# Patient Record
Sex: FEMALE | Race: WHITE | Employment: OTHER | URBAN - METROPOLITAN AREA
[De-identification: names, ages, dates, MRNs, and addresses within clinical notes are randomized per-mention and may not be internally consistent; named-entity substitution may affect disease eponyms.]

---

## 2017-01-06 DIAGNOSIS — N76.0 BV (BACTERIAL VAGINOSIS): Primary | ICD-10-CM

## 2017-01-06 DIAGNOSIS — B96.89 BV (BACTERIAL VAGINOSIS): Primary | ICD-10-CM

## 2017-01-06 RX ORDER — METRONIDAZOLE 7.5 MG/G
1 GEL VAGINAL AT BEDTIME
Qty: 70 G | Refills: 0 | Status: SHIPPED | OUTPATIENT
Start: 2017-01-06 | End: 2017-01-11

## 2017-02-16 DIAGNOSIS — Z30.41 USES ORAL CONTRACEPTION: Primary | ICD-10-CM

## 2017-02-16 DIAGNOSIS — Z30.41 ENCOUNTER FOR SURVEILLANCE OF CONTRACEPTIVE PILLS: ICD-10-CM

## 2017-02-17 RX ORDER — LEVONORGESTREL/ETHIN.ESTRADIOL 0.1-0.02MG
1 TABLET ORAL DAILY
Qty: 84 TABLET | Refills: 0 | Status: SHIPPED | OUTPATIENT
Start: 2017-02-17 | End: 2017-02-21

## 2017-02-21 RX ORDER — LEVONORGESTREL/ETHIN.ESTRADIOL 0.1-0.02MG
1 TABLET ORAL DAILY
Qty: 84 TABLET | Refills: 0 | Status: SHIPPED | OUTPATIENT
Start: 2017-02-21 | End: 2018-07-17

## 2018-07-08 ENCOUNTER — HOSPITAL ENCOUNTER (EMERGENCY)
Facility: CLINIC | Age: 33
Discharge: HOME | End: 2018-07-08
Attending: EMERGENCY MEDICINE | Admitting: EMERGENCY MEDICINE

## 2018-07-08 VITALS
SYSTOLIC BLOOD PRESSURE: 100 MMHG | RESPIRATION RATE: 18 BRPM | OXYGEN SATURATION: 98 % | WEIGHT: 99.2 LBS | TEMPERATURE: 99.1 F | DIASTOLIC BLOOD PRESSURE: 67 MMHG | BODY MASS INDEX: 16.94 KG/M2 | HEIGHT: 64 IN

## 2018-07-08 DIAGNOSIS — N10 ACUTE PYELONEPHRITIS: ICD-10-CM

## 2018-07-08 PROCEDURE — 96375 TX/PRO/DX INJ NEW DRUG ADDON: CPT

## 2018-07-08 PROCEDURE — 99285 EMERGENCY DEPT VISIT HI MDM: CPT | Mod: 25

## 2018-07-08 PROCEDURE — 96361 HYDRATE IV INFUSION ADD-ON: CPT

## 2018-07-08 PROCEDURE — 25000128 H RX IP 250 OP 636: Performed by: EMERGENCY MEDICINE

## 2018-07-08 PROCEDURE — 96374 THER/PROPH/DIAG INJ IV PUSH: CPT

## 2018-07-08 PROCEDURE — 96376 TX/PRO/DX INJ SAME DRUG ADON: CPT

## 2018-07-08 RX ORDER — MORPHINE SULFATE 2 MG/ML
2 INJECTION, SOLUTION INTRAMUSCULAR; INTRAVENOUS ONCE
Status: COMPLETED | OUTPATIENT
Start: 2018-07-08 | End: 2018-07-08

## 2018-07-08 RX ORDER — HYDROCODONE BITARTRATE AND ACETAMINOPHEN 5; 325 MG/1; MG/1
1 TABLET ORAL EVERY 6 HOURS PRN
Qty: 10 TABLET | Refills: 0 | Status: SHIPPED | OUTPATIENT
Start: 2018-07-08 | End: 2018-07-17

## 2018-07-08 RX ORDER — ONDANSETRON 4 MG/1
4 TABLET, ORALLY DISINTEGRATING ORAL EVERY 6 HOURS PRN
Qty: 10 TABLET | Refills: 0 | Status: SHIPPED | OUTPATIENT
Start: 2018-07-08 | End: 2018-07-11

## 2018-07-08 RX ORDER — ONDANSETRON 2 MG/ML
4 INJECTION INTRAMUSCULAR; INTRAVENOUS ONCE
Status: COMPLETED | OUTPATIENT
Start: 2018-07-08 | End: 2018-07-08

## 2018-07-08 RX ORDER — MORPHINE SULFATE 4 MG/ML
4 INJECTION, SOLUTION INTRAMUSCULAR; INTRAVENOUS ONCE
Status: COMPLETED | OUTPATIENT
Start: 2018-07-08 | End: 2018-07-08

## 2018-07-08 RX ADMIN — MORPHINE SULFATE 2 MG: 2 INJECTION, SOLUTION INTRAMUSCULAR; INTRAVENOUS at 08:42

## 2018-07-08 RX ADMIN — ONDANSETRON 4 MG: 2 INJECTION INTRAMUSCULAR; INTRAVENOUS at 07:19

## 2018-07-08 RX ADMIN — SODIUM CHLORIDE 1000 ML: 9 INJECTION, SOLUTION INTRAVENOUS at 07:14

## 2018-07-08 RX ADMIN — MORPHINE SULFATE 4 MG: 4 INJECTION INTRAVENOUS at 07:19

## 2018-07-08 ASSESSMENT — ENCOUNTER SYMPTOMS
FEVER: 1
VOMITING: 1
DYSURIA: 0
BACK PAIN: 1

## 2018-07-08 NOTE — ED AVS SNAPSHOT
Emergency Department    [address] [facility] [address]    Phone:  [phone]    Fax:  [phone]                                       Kassandra Aguilar   MRN: [id number]    Department:   Emergency Department   Date of Visit:  7/8/2018           Patient Information     Date Of Birth          1985        Your diagnoses for this visit were:     Acute pyelonephritis        You were seen by Louise Price MD.      Follow-up Information     Follow up with Robin Arreguin MD In 2 days.    Specialty:  Internal Medicine    Why:  to ensure you are improving    Contact information:    [address]  [facility] [address] [phone]          Follow up with  Emergency Department.    Specialty:  EMERGENCY MEDICINE    Why:  As needed, If symptoms worsen    Contact information:    [address] [facility] [address] [phone]        Discharge Instructions         Discharge Instructions for Pyelonephritis  You have been told you have a kidney infection. This is called pyelonephritis. The infection can be serious. It can damage your kidneys and cause bacteria to enter your bloodstream. You were treated in the hospital. Once you return home, here s what you can do at home to aid in your recovery and prevent future infections.  Home care    Take all the medicine you were prescribed, even if you feel better. Not finishing the medicine can make the infection come back. It may also make a future infection harder to treat.    Unless told not to by your healthcare provider, drink 8 to 12 glasses of fluid every day. Clear fluids, such as water, are best. This may help flush the infection from your system.  Preventing future infection    Keep your genital area clean. Use mild soap. Rinse with water.    If you are a woman, always wipe the genital area from front to back.    Urinate frequently. Avoid holding urine in the bladder for a long  time.    Always urinate after sexual intercourse.  Follow-up care  Follow up with your healthcare provider, or as advised. And see your healthcare provider for regular lab tests as directed.     When to call your healthcare provider  Call your healthcare provider right away if you have any of the following:    Decreased urine output or trouble urinating    Severe pain in the lower back or flank    Fever of 100.4 F (38 C) or higher, or as directed by your healthcare provider    Shaking chills    Vomiting    Blood in your urine    Dark-colored or foul-smelling urine    Nausea or other problems that prevent you from taking your prescribed medicine   Date Last Reviewed: 2/1/2017 2000-2017 [employer]. [address] [place] [address], [place] [address]. All rights reserved. This information is not intended as a substitute for professional medical care. Always follow your healthcare professional's instructions.          24 Hour Appointment Hotline       To make an appointment at any [facility], call [phone] ([phone]). If you don't have a family doctor or clinic, we will help you find one. [facility] clinics are conveniently located to serve the needs of you and your family.             Review of your medicines      START taking        Dose / Directions Last dose taken    HYDROcodone-acetaminophen 5-325 MG per tablet   Commonly known as:  NORCO   Dose:  1 tablet   Quantity:  10 tablet        Take 1 tablet by mouth every 6 hours as needed for severe pain   Refills:  0        ondansetron 4 MG ODT tab   Commonly known as:  ZOFRAN ODT   Dose:  4 mg   Quantity:  10 tablet        Take 1 tablet (4 mg) by mouth every 6 hours as needed for nausea   Refills:  0          Our records show that you are taking the medicines listed below. If these are incorrect, please call your family doctor or clinic.        Dose / Directions Last dose taken    LEVAQUIN PO        Refills:  0        levonorgestrel-ethinyl  estradiol 0.1-20 MG-MCG per tablet   Commonly known as:  AUBRA   Dose:  1 tablet   Quantity:  84 tablet        Take 1 tablet by mouth daily   Refills:  0                Information about OPIOIDS     PRESCRIPTION OPIOIDS: WHAT YOU NEED TO KNOW   We gave you an opioid (narcotic) pain medicine. It is important to manage your pain, but opioids are not always the best choice. You should first try all the other options your care team gave you. Take this medicine for as short a time (and as few doses) as possible.     These medicines have risks:    DO NOT drive when on new or higher doses of pain medicine. These medicines can affect your alertness and reaction times, and you could be arrested for driving under the influence (DUI). If you need to use opioids long-term, talk to your care team about driving.    DO NOT operate heave machinery    DO NOT do any other dangerous activities while taking these medicines.     DO NOT drink any alcohol while taking these medicines.      If the opioid prescribed includes acetaminophen, DO NOT take with any other medicines that contain acetaminophen. Read all labels carefully. Look for the word  acetaminophen  or  Tylenol.  Ask your pharmacist if you have questions or are unsure.    You can get addicted to pain medicines, especially if you have a history of addiction (chemical, alcohol or substance dependence). Talk to your care team about ways to reduce this risk.    Store your pills in a secure place, locked if possible. We will not replace any lost or stolen medicine. If you don t finish your medicine, please throw away (dispose) as directed by your pharmacist. The [place] [employer]ution Control Agency has more information about safe disposal: https://www.pca.[facility].[place].us/living-green/managing-unwanted-medications.     All opioids tend to cause constipation. Drink plenty of water and eat foods that have a lot of fiber, such as fruits, vegetables, prune juice, apple juice and high-fiber  cereal. Take a laxative (Miralax, milk of magnesia, Colace, Senna) if you don t move your bowels at least every other day.         Prescriptions were sent or printed at these locations (2 Prescriptions)                   Other Prescriptions                Printed at Department/Unit printer (2 of 2)         HYDROcodone-acetaminophen (NORCO) 5-325 MG per tablet               ondansetron (ZOFRAN ODT) 4 MG ODT tab                Orders Needing Specimen Collection     None      Pending Results     No orders found from 7/6/2018 to 7/9/2018.            Pending Culture Results     No orders found from 7/6/2018 to 7/9/2018.            Pending Results Instructions     If you had any lab results that were not finalized at the time of your Discharge, you can call the ED Lab Result RN at [phone]. You will be contacted by this team for any positive Lab results or changes in treatment. The nurses are available 7 days a week from 10A to 6:30P.  You can leave a message 24 hours per day and they will return your call.        Test Results From Your Hospital Stay               Clinical Quality Measure: Blood Pressure Screening     Your blood pressure was checked while you were in the emergency department today. The last reading we obtained was  BP: 100/70 . Please read the guidelines below about what these numbers mean and what you should do about them.  If your systolic blood pressure (the top number) is less than 120 and your diastolic blood pressure (the bottom number) is less than 80, then your blood pressure is normal. There is nothing more that you need to do about it.  If your systolic blood pressure (the top number) is 120-139 or your diastolic blood pressure (the bottom number) is 80-89, your blood pressure may be higher than it should be. You should have your blood pressure rechecked within a year by a primary care provider.  If your systolic blood pressure (the top number) is 140 or greater or your diastolic blood  pressure (the bottom number) is 90 or greater, you may have high blood pressure. High blood pressure is treatable, but if left untreated over time it can put you at risk for heart attack, stroke, or kidney failure. You should have your blood pressure rechecked by a primary care provider within the next 4 weeks.  If your provider in the emergency department today gave you specific instructions to follow-up with your doctor or provider even sooner than that, you should follow that instruction and not wait for up to 4 weeks for your follow-up visit.        Thank you for choosing [facility]       Thank you for choosing [facility] for your care. Our goal is always to provide you with excellent care. Hearing back from our patients is one way we can continue to improve our services. Please take a few minutes to complete the written survey that you may receive in the mail after you visit with us. Thank you!        [employer] Information     [employer] gives you secure access to your electronic health record. If you see a primary care provider, you can also send messages to your care team and make appointments. If you have questions, please call your primary care clinic.  If you do not have a primary care provider, please call [phone] and they will assist you.        Care EveryWhere ID     This is your Care EveryWhere ID. This could be used by other organizations to access your [facility] medical records  [id number]        Equal Access to Services     JIMMY GALARZA : Christine Dent, waaxda luqadaha, qaybta kaalmada adedebbie, bart funes. So [facility] [phone].    ATENCIÓN: Si habla español, tiene a sharp disposición servicios gratuitos de asistencia lingüística. Llame al [phone].    We comply with applicable federal civil rights laws and [place] laws. We do not discriminate on the basis of race, color, national origin, age, disability, sex, sexual orientation, or gender  identity.            After Visit Summary       This is your record. Keep this with you and show to your community pharmacist(s) and doctor(s) at your next visit.

## 2018-07-08 NOTE — DISCHARGE INSTRUCTIONS
Discharge Instructions for Pyelonephritis  You have been told you have a kidney infection. This is called pyelonephritis. The infection can be serious. It can damage your kidneys and cause bacteria to enter your bloodstream. You were treated in the hospital. Once you return home, here s what you can do at home to aid in your recovery and prevent future infections.  Home care    Take all the medicine you were prescribed, even if you feel better. Not finishing the medicine can make the infection come back. It may also make a future infection harder to treat.    Unless told not to by your healthcare provider, drink 8 to 12 glasses of fluid every day. Clear fluids, such as water, are best. This may help flush the infection from your system.  Preventing future infection    Keep your genital area clean. Use mild soap. Rinse with water.    If you are a woman, always wipe the genital area from front to back.    Urinate frequently. Avoid holding urine in the bladder for a long time.    Always urinate after sexual intercourse.  Follow-up care  Follow up with your healthcare provider, or as advised. And see your healthcare provider for regular lab tests as directed.     When to call your healthcare provider  Call your healthcare provider right away if you have any of the following:    Decreased urine output or trouble urinating    Severe pain in the lower back or flank    Fever of 100.4 F (38 C) or higher, or as directed by your healthcare provider    Shaking chills    Vomiting    Blood in your urine    Dark-colored or foul-smelling urine    Nausea or other problems that prevent you from taking your prescribed medicine   Date Last Reviewed: 2/1/2017 2000-2017 [employer]. [address] [place] [address], [place] [address]. All rights reserved. This information is not intended as a substitute for professional medical care. Always follow your healthcare professional's instructions.

## 2018-07-08 NOTE — ED PROVIDER NOTES
History     Chief Complaint:  Back Pain    HPI   Kassandra Aguilar is a 32 year old female with current pyelonephritis who presents with back pain. The patient reports that she was diagnosed with a [place] yesterday [place] [facility] [place] Rocephin there and discharged with 7 days of Levaquin, which she took last night and then vomited at 0300 hours today. She reports that she originally presented for symptoms of left sided back pain in the evenings with resolution during the day, but since discharge her back pain has been constant, prompting her presentation by significant other. She describes her pain as located to the lower back with upwards radiation. She denies the symptoms of burning with urination but endorses subjective fever. Furthermore, she states that she was not written discharge antiemetics and pain meds.     [facility] Labs:    ED CHEMISTRY LABS(NA,K,CL,CO2,GLU,CREAT,CA-IONIZED,ANION GAP) (07/07/2018 3:28 PM)   Component Value Ref Range   Chloride 107 92 - 108 mEq/L   ICA, Actual 4.43 4.40 - 5.20 mg/dL   Sodium 136 135 - 148 mEq/L   Glucose 110 (H) 70 - 100 mg/dL   Creatinine 0.70 0.50 - 1.00 mg/dL   GFR, African American 118 >=60 ml/min/1.73m2   GFR, Non- 98 >=60 ml/min/1.73m2   BICARB 23 22 - 26 mEq/L   ICA, pH Corrected 4.37 (L) 4.40 - 5.20 mg/dL   AnGap 6 (L) 8 - 16 mEq/L   Potassium 3.0 (L) 3.5 - 5.3 mEq/L     POC PREGNANCY TEST URINE (07/07/2018 2:47 PM)   Component Value Ref Range   Pregnancy Ur Negative Negative     POC URINE MULTISTIX 10 (07/07/2018 2:46 PM)   Component Value Ref Range   Urine Glucose Negative Negative   Bili UA Negative Negative   Ketones Trace Negative   Specific Gravity 1.020 1.003 - 1.030   Blood Ur Moderate (A) Neg - Trace   PH Urine 6.0 5.0 - 7.0   Protein Ur 100 (A) Neg - Tr mg/dL   Urobilinogen 1.0 0.2 - 1.0 E.U./dL   Nitrite Ur Positive (A) Negative   Leuk Est Small (A) Negative       Allergies:  No known drug  "allergies    Medications:    Levaquin    Past Medical History:    Need for prophylactic immunotherapy  Pyelonephritis    Past Surgical History:    History reviewed. No pertinent surgical history.    Family History:    Mother: Thyroid Disease; Migraines    Social History:  Marital Status:  Single   Tobacco Status: Never Used  Alcohol Use: 2 Standard drinks or equivalent per week  Presents With: Significant Other         Review of Systems   Constitutional: Positive for fever.   Gastrointestinal: Positive for vomiting.   Genitourinary: Negative for dysuria.   Musculoskeletal: Positive for back pain.   All other systems reviewed and are negative.    Physical Exam   First Vitals:  BP: 115/60  Heart Rate: 101  Temp: 99.1  F (37.3  C)  Resp: 18  Height: 162.6 cm (5' 4\")  Weight: 45 kg (99 lb 3.2 oz)  SpO2: 98 %    Patient Vitals for the past 24 hrs:   BP Temp Temp src Heart Rate Resp SpO2 Height Weight   07/08/18 0745 99/65 - - - - 98 % - -   07/08/18 0730 94/69 - - - - 97 % - -   07/08/18 0652 115/60 99.1  F (37.3  C) Oral 101 18 98 % 1.626 m (5' 4\") 45 kg (99 lb 3.2 oz)       Physical Exam  General/Appearance: appears stated age, well-groomed, appears comfortable  Eyes: EOMI, no scleral injection, no icterus  ENT: MMM  Neck: supple, nl ROM, no stiffness  Cardiovascular: RRR, nl S1S2, no m/r/g, 2+ pulses in all 4 extremities, cap refill <2sec  Respiratory: CTAB, good air movement throughout, no wheezes/rhonchi/rales, no increased WOB, no retractions  Back: no lesions, + L CVA ttp  GI: abd soft, non-distended, nttp,  no HSM, no rebound, no guarding, nl BS  MSK: VIGIL, good tone, no bony abnormality  Skin: warm and well-perfused, no rash, no edema, no ecchymosis, nl turgor  Neuro: GCS 15, alert and oriented, no gross focal neuro deficits  Psych: interacts appropriately  Heme: no petechia, no purpura, no active bleeding    Emergency Department Course     Interventions:  0714: 0.9% Sodium Chloride Bolus 1000 ml IV   0719: " Zofran 4mg IV injection   0719: Morphine Injection, 4 mg, IV  The patient's symptoms were improved with parenteral narcotics.    Emergency Department Course:  Past medical records, nursing notes, and vitals reviewed.  0706: I performed an exam of the patient and obtained history, as documented above.   IV inserted. The patient was placed on continuous cardiac monitoring and pulse oximetry.  0838: I rechecked the patient. The patient has tolerated PO challenge. Findings and plan explained to the Patient. Patient discharged home with instructions regarding supportive care, medications, and reasons to return. The importance of close follow-up was reviewed.     Impression & Plan      Medical Decision Making:  Kassandra Aguilar is a 32 year old female who was diagnosed with acute pyelonephritis yesterday at [facility] after she had a fever with left flank pain.  She was given ceftriaxone and started on Levaquin.  Unfortunately she went to take her first dose of Levaquin and vomited up.  Due to nausea she has been unable to tolerate p.o. or antibiotics.  Here she was given IV fluids, Zofran, pain medication she tolerated p.o. challenge.  She feels comfortable with a prescription for pain meds and nausea meds attempting outpatient management again.  At this time she is afebrile and well-appearing and I do not feel that additional blood, urine, or cultures are needed.    Diagnosis:    ICD-10-CM    1. Acute pyelonephritis N10        Disposition:  discharged to home with significant other    Discharge Medications:  New Prescriptions    HYDROCODONE-ACETAMINOPHEN (NORCO) 5-325 MG PER TABLET    Take 1 tablet by mouth every 6 hours as needed for severe pain    ONDANSETRON (ZOFRAN ODT) 4 MG ODT TAB    Take 1 tablet (4 mg) by mouth every 6 hours as needed for nausea         Ronald Ferrer  7/8/2018    EMERGENCY DEPARTMENT  IRonald am serving as a scribe at 7:06 AM on 7/8/2018 to document services personally performed  by Louise Price MD based on my observations and the provider's statements to me.         Louise Price MD  07/08/18 0992

## 2018-07-08 NOTE — ED AVS SNAPSHOT
Emergency Department    [address] [place] [address]    Phone:  [phone]    Fax:  [phone]                                       Kassandra Aguilar   MRN: [id number]    Department:   Emergency Department   Date of Visit:  7/8/2018           After Visit Summary Signature Page     I have received my discharge instructions, and my questions have been answered. I have discussed any challenges I see with this plan with the nurse or doctor.    ..........................................................................................................................................  Patient/Patient Representative Signature      ..........................................................................................................................................  Patient Representative Print Name and Relationship to Patient    ..................................................               ................................................  Date                                            Time    ..........................................................................................................................................  Reviewed by Signature/Title    ...................................................              ..............................................  Date                                                            Time

## 2018-07-10 ENCOUNTER — TELEPHONE (OUTPATIENT)
Dept: NURSING | Facility: CLINIC | Age: 33
End: 2018-07-10

## 2018-07-10 NOTE — TELEPHONE ENCOUNTER
"  [facility]NA Triage Call  Presenting Problem:\"I was in the ER (see epic) recently for a kidney infection. I am also taking a pain med which is making me constipated. Can I take a stool softener?\"  Patient Recommendations/Teaching:Yes , advised to call back if needed. Denies triage.  Yara Anglea RN [facility]          "

## 2018-07-16 ENCOUNTER — NURSE TRIAGE (OUTPATIENT)
Dept: NURSING | Facility: CLINIC | Age: 33
End: 2018-07-16

## 2018-07-16 NOTE — TELEPHONE ENCOUNTER
"\"I was seen in the ER about a week ago after being dx with a kidney infection at an \".  Caller finished her abx on Friday and she is starting to develop some left flank pain.    She was told to make f/u appointment with PCP, however she does not have a PCP and is looking to make an appointment within [place].    Warm transferred to scheduling.    Reason for Disposition    MODERATE pain (e.g., interferes with normal activities or awakens from sleep)    Additional Information    Negative: Passed out (i.e., lost consciousness, collapsed and was not responding)    Negative: Shock suspected (e.g., cold/pale/clammy skin, too weak to stand, low BP, rapid pulse)    Negative: Difficult to awaken or acting confused  (e.g., disoriented, slurred speech)    Negative: Sounds like a life-threatening emergency to the triager    Negative: Followed a major injury to the back (e.g., MVA, fall > 10 feet or 3 meters, penetrating injury, etc.)    Negative: Back pain or flank pain during pregnancy    Negative: Upper, mid or lower back pain that occurs mainly in the midline    Negative: [1] SEVERE pain (e.g., excruciating, scale 8-10) AND [2] present > 1 hour    Negative: [1] SEVERE pain (e.g., excruciating, scale 8-10) AND [2] not improved after pain medicine    Negative: [1] Abdominal pain AND [2] age > 60    Negative: [1] Sudden onset of severe flank pain AND [2] age > 60    Negative: [1] Unable to urinate (or only a few drops) > 4 hours AND     [2] bladder feels very full (e.g., palpable bladder or strong urge to urinate)    Negative: Vomiting    Negative: Weakness of a leg or foot (e.g., unable to bear weight, dragging foot)    Negative: Patient sounds very sick or weak to the triager    Negative: Fever > 100.5 F (38.1 C)    Negative: Pain or burning with urination    Protocols used: FLANK PAIN-ADULT-AH    Svetlana Crandall, RN  [facility]      "

## 2018-07-17 ENCOUNTER — OFFICE VISIT (OUTPATIENT)
Dept: FAMILY MEDICINE | Facility: CLINIC | Age: 33
End: 2018-07-17

## 2018-07-17 VITALS
SYSTOLIC BLOOD PRESSURE: 104 MMHG | TEMPERATURE: 99.1 F | BODY MASS INDEX: 16.58 KG/M2 | HEART RATE: 60 BPM | WEIGHT: 96.6 LBS | DIASTOLIC BLOOD PRESSURE: 54 MMHG | OXYGEN SATURATION: 100 %

## 2018-07-17 DIAGNOSIS — N12 PYELONEPHRITIS: ICD-10-CM

## 2018-07-17 DIAGNOSIS — R10.9 LEFT FLANK PAIN: Primary | ICD-10-CM

## 2018-07-17 LAB
ALBUMIN UR-MCNC: NEGATIVE MG/DL
APPEARANCE UR: CLEAR
BACTERIA #/AREA URNS HPF: ABNORMAL /HPF
BETA HCG QUAL IFA URINE: NEGATIVE
BILIRUB UR QL STRIP: NEGATIVE
COLOR UR AUTO: YELLOW
GLUCOSE UR STRIP-MCNC: NEGATIVE MG/DL
HGB UR QL STRIP: ABNORMAL
KETONES UR STRIP-MCNC: NEGATIVE MG/DL
LEUKOCYTE ESTERASE UR QL STRIP: NEGATIVE
NITRATE UR QL: NEGATIVE
NON-SQ EPI CELLS #/AREA URNS LPF: ABNORMAL /LPF
PH UR STRIP: 7.5 PH (ref 5–7)
RBC #/AREA URNS AUTO: ABNORMAL /HPF
SOURCE: ABNORMAL
SP GR UR STRIP: 1.01 (ref 1–1.03)
UROBILINOGEN UR STRIP-ACNC: 0.2 EU/DL (ref 0.2–1)
WBC #/AREA URNS AUTO: ABNORMAL /HPF

## 2018-07-17 PROCEDURE — 81001 URINALYSIS AUTO W/SCOPE: CPT | Performed by: NURSE PRACTITIONER

## 2018-07-17 PROCEDURE — 99214 OFFICE O/P EST MOD 30 MIN: CPT | Performed by: NURSE PRACTITIONER

## 2018-07-17 PROCEDURE — 87086 URINE CULTURE/COLONY COUNT: CPT | Performed by: NURSE PRACTITIONER

## 2018-07-17 PROCEDURE — 84703 CHORIONIC GONADOTROPIN ASSAY: CPT | Performed by: NURSE PRACTITIONER

## 2018-07-17 NOTE — MR AVS SNAPSHOT
After Visit Summary   7/17/2018    Kassandra Aguilar    MRN: [id number]           Patient Information     Date Of Birth          1985        Visit Information        Provider Department      7/17/2018 12:30 PM Opal Hinton APRN CNP [facility]        Today's Diagnoses     Left flank pain    -  1    Pyelonephritis           Follow-ups after your visit        Future tests that were ordered for you today     Open Future Orders        Priority Expected Expires Ordered    CT Abdomen Pelvis w/o Contrast Routine  7/17/2019 7/17/2018            Who to contact     If you have questions or need follow up information about today's clinic visit or your schedule please contact [facility] directly at [phone].  Normal or non-critical lab and imaging results will be communicated to you by BiolineRxhart, letter or phone within 4 business days after the clinic has received the results. If you do not hear from us within 7 days, please contact the clinic through BiolineRxhart or phone. If you have a critical or abnormal lab result, we will notify you by phone as soon as possible.  Submit refill requests through Eco[employer] or call your pharmacy and they will forward the refill request to us. Please allow 3 business days for your refill to be completed.          Additional Information About Your Visit        MyChart Information     EcoStart gives you secure access to your electronic health record. If you see a primary care provider, you can also send messages to your care team and make appointments. If you have questions, please call your primary care clinic.  If you do not have a primary care provider, please call [phone] and they will assist you.        Care EveryWhere ID     This is your Care EveryWhere ID. This could be used by other organizations to access your [facility] medical records  [id number]        Your Vitals Were     Pulse Temperature Last Period Pulse Oximetry  Breastfeeding? BMI (Body Mass Index)    60 99.1  F (37.3  C) (Tympanic) 06/18/2018 100% No 16.58 kg/m2       Blood Pressure from Last 3 Encounters:   07/17/18 104/54   07/08/18 100/67   10/18/16 90/52    Weight from Last 3 Encounters:   07/17/18 96 lb 9.6 oz (43.8 kg)   07/08/18 99 lb 3.2 oz (45 kg)   10/18/16 103 lb (46.7 kg)              We Performed the Following     *UA reflex to Microscopic and Culture ([facility] [facility] (except [place] and [place])     Beta HCG qual IFA urine     Urine Culture Aerobic Bacterial     Urine Microscopic        Primary Care Provider Office Phone # Fax #    Kelbyleandroroel BUSTAMANTE MD Kallie [phone]       [address]  [facility] [address]        Equal Access to Services     [facility] : Hadii aad ku hadasho Soomaali, waaxda luqadaha, qaybta kaalmada adeegyada, bart bruce hayluannen amber shelley . So [facility] [phone].    ATENCIÓN: Si habla español, tiene a sharp disposición servicios gratuitos de asistencia lingüística. Llame al [phone].    We comply with applicable federal civil rights laws and [place] laws. We do not discriminate on the basis of race, color, national origin, age, disability, sex, sexual orientation, or gender identity.            Thank you!     Thank you for choosing [facility]  for your care. Our goal is always to provide you with excellent care. Hearing back from our patients is one way we can continue to improve our services. Please take a few minutes to complete the written survey that you may receive in the mail after your visit with us. Thank you!             Your Updated Medication List - Protect others around you: Learn how to safely use, store and throw away your medicines at www.disposemymeds.org.      Notice  As of 7/17/2018  1:09 PM    You have not been prescribed any medications.

## 2018-07-17 NOTE — PROGRESS NOTES
"  SUBJECTIVE:   Kassandra Aguilar is a 32 year old female who presents to clinic today for the following health issues:      ED/UC Followup:    Facility:   ED  Date of visit: 7/8/18  Reason for visit: acute pyelonephritis  Medical Decision Making:  Kassandra Aguilar is a 32 year old female who was diagnosed with acute pyelonephritis yesterday at [facility] after she had a fever with left flank pain.  She was given ceftriaxone and started on Levaquin.  Unfortunately she went to take her first dose of Levaquin and vomited up.  Due to nausea she has been unable to tolerate p.o. or antibiotics.  Here she was given IV fluids, Zofran, pain medication she tolerated p.o. challenge.  She feels comfortable with a prescription for pain meds and nausea meds attempting outpatient management again.  At this time she is afebrile and well-appearing and I do not feel that additional blood, urine, or cultures are needed.     Current Status: finished abx on 7/13/18, having some L flank \"tenderness\" since yesterday     No personal hx of kidney stones- no previous imaging  No fam hx kidney stones    Problem list and histories reviewed & adjusted, as indicated.  Additional history: as documented    Patient Active Problem List   Diagnosis     Need for prophylactic immunotherapy     CARDIOVASCULAR SCREENING; LDL GOAL LESS THAN 160     No past surgical history on file.    Social History   Substance Use Topics     Smoking status: Never Smoker     Smokeless tobacco: Never Used     Alcohol use 1.2 oz/week     2 Standard drinks or equivalent per week     Family History   Problem Relation Age of Onset     Family History Negative No family hx of      Thyroid Disease Mother      Migraines Mother          No current outpatient prescriptions on file.     Allergies   Allergen Reactions     No Known Drug Allergies        Reviewed and updated as needed this visit by clinical staff  Allergies  Meds  Problems       Reviewed " and updated as needed this visit by Provider  Allergies  Meds  Problems         ROS:  Constitutional, HEENT, cardiovascular, pulmonary, gi and gu systems are negative, except as otherwise noted.  Consti; denies fever or chills now, no N/V, able to sleep  : mild left flank pain  OBJECTIVE:     /54  Pulse 60  Temp 99.1  F (37.3  C) (Tympanic)  Wt 96 lb 9.6 oz (43.8 kg)  LMP 06/18/2018  SpO2 100%  Breastfeeding? No  BMI 16.58 kg/m2  Body mass index is 16.58 kg/(m^2).  GENERAL: healthy, alert and no distress  RESP: lungs clear to auscultation - no rales, rhonchi or wheezes  CV: regular rate and rhythm, normal S1 S2, no S3 or S4, no murmur, click or rub, no peripheral edema and peripheral pulses strong  ABDOMEN: soft, nontender, no flank pain ,  no hepatosplenomegaly, no masses and bowel sounds normal  BACK: no CVA tenderness,  Diagnostic Test Results:  CT kidneys w/o contrast     FINDINGS: There are no stones seen within either kidney, either  ureter, or the bladder. There is no hydroureter or hydronephrosis.  There is no perinephric fat stranding. Kidneys are normal in size and  configuration.  Moderate stool. There are no dilated loops of small  intestine or large bowel to suggest ileus or obstruction. Normal  caliber aorta. No diverticulitis. No free air or free fluid. Liver  unremarkable. Gallbladder unremarkable.  No splenomegaly.  No definite  adrenal nodules.  Pancreas grossly unremarkable. The remainder of the  visualized abdomen is unremarkable on this noncontrast scan. Survey of  the visualized bony structures demonstrates no destructive bony  lesions.  The visualized lung bases are unremarkable.     Results for orders placed or performed in visit on 07/17/18   *UA reflex to Microscopic and Culture ([facility] (except [place] and [place])   Result Value Ref Range    Color Urine Yellow     Appearance Urine Clear     Glucose Urine Negative NEG^Negative mg/dL    Bilirubin  Urine Negative NEG^Negative    Ketones Urine Negative NEG^Negative mg/dL    Specific Gravity Urine 1.015 1.003 - 1.035    Blood Urine Trace (A) NEG^Negative    pH Urine 7.5 (H) 5.0 - 7.0 pH    Protein Albumin Urine Negative NEG^Negative mg/dL    Urobilinogen Urine 0.2 0.2 - 1.0 EU/dL    Nitrite Urine Negative NEG^Negative    Leukocyte Esterase Urine Negative NEG^Negative    Source Midstream Urine    Urine Microscopic   Result Value Ref Range    WBC Urine 0 - 5 OTO5^0 - 5 /HPF    RBC Urine O - 2 OTO2^O - 2 /HPF    Squamous Epithelial /LPF Urine Many (A) FEW^Few /LPF    Bacteria Urine Few (A) NEG^Negative /HPF   Beta HCG qual IFA urine   Result Value Ref Range    Beta HCG Qual IFA Urine Negative NEG^Negative             ASSESSMENT/PLAN:       ICD-10-CM    1. Left flank pain R10.9 *UA reflex to Microscopic and Culture ([place] and [facility] (except [place] and [place])     Urine Microscopic     CT Abdomen Pelvis w/o Contrast     Beta HCG qual IFA urine   2. Pyelonephritis N12 Urine Culture Aerobic Bacterial   the infection appears to have cleared and there is no sign of ureter or kidney obstruction  Make sure to keep up with fluids to prevent future UTIs and pyleonephritis    KALPANA Fernandez [facility]

## 2018-07-17 NOTE — LETTER
[facility]  [address] [facility]  [address]  [place]  [address]  Tel: [phone]    July 23, 2018    Kassandra Bridges Melissaesau  [address]  [facility] [address]        Dear Ms. Cyrus Aguilar,    This culture report confirms that you no longer have an infection     If you have any further questions or problems, please contact our office.      Sincerely,    Opal Hinton NP/ Michelle Bell, CMA  Results for orders placed or performed in visit on 07/17/18   *UA reflex to Microscopic and Culture ([facility] [facility] (except [place] and [place])   Result Value Ref Range    Color Urine Yellow     Appearance Urine Clear     Glucose Urine Negative NEG^Negative mg/dL    Bilirubin Urine Negative NEG^Negative    Ketones Urine Negative NEG^Negative mg/dL    Specific Gravity Urine 1.015 1.003 - 1.035    Blood Urine Trace (A) NEG^Negative    pH Urine 7.5 (H) 5.0 - 7.0 pH    Protein Albumin Urine Negative NEG^Negative mg/dL    Urobilinogen Urine 0.2 0.2 - 1.0 EU/dL    Nitrite Urine Negative NEG^Negative    Leukocyte Esterase Urine Negative NEG^Negative    Source Midstream Urine    Urine Microscopic   Result Value Ref Range    WBC Urine 0 - 5 OTO5^0 - 5 /HPF    RBC Urine O - 2 OTO2^O - 2 /HPF    Squamous Epithelial /LPF Urine Many (A) FEW^Few /LPF    Bacteria Urine Few (A) NEG^Negative /HPF   Beta HCG qual IFA urine   Result Value Ref Range    Beta HCG Qual IFA Urine Negative NEG^Negative      Urine Culture Aerobic Bacterial   Result Value Ref Range    Specimen Description Midstream Urine     Culture Micro No growth                Enclosure: Lab Results

## 2018-07-19 ENCOUNTER — HOSPITAL ENCOUNTER (OUTPATIENT)
Dept: CT IMAGING | Facility: CLINIC | Age: 33
Discharge: HOME | End: 2018-07-19
Attending: NURSE PRACTITIONER | Admitting: NURSE PRACTITIONER

## 2018-07-19 DIAGNOSIS — R10.9 LEFT FLANK PAIN: ICD-10-CM

## 2018-07-19 PROCEDURE — 74176 CT ABD & PELVIS W/O CONTRAST: CPT

## 2018-07-19 NOTE — PROGRESS NOTES
Good news, Kassandra, there is no kidney stone that could have precipitated the pyelonephritis or your current mild flank pain and the scan shows no other abdominal abnormalities either.    Please send letter to Ms Aguilar.

## 2018-07-19 NOTE — LETTER
[facility]  [address] [facility]  [address]  [place]  [address]  Tel: [phone]    July 19, 2018    Kassandra Kelsey Aguilar  [address]  [facility] [address]        Dear Ms. Bridges Melissaesau,    Good news, Kassandra, there is no kidney stone that could have precipitated the pyelonephritis or your current mild flank pain and the scan shows no other abdominal abnormalities either.  If you have any further questions or problems, please contact our office.      Sincerely,    Opal Hinton, NELDA/SML

## 2018-07-20 LAB
BACTERIA SPEC CULT: NO GROWTH
SPECIMEN SOURCE: NORMAL

## 2018-12-05 ENCOUNTER — HOSPITAL ENCOUNTER (EMERGENCY)
Facility: CLINIC | Age: 33
Discharge: HOME | End: 2018-12-06
Attending: EMERGENCY MEDICINE | Admitting: EMERGENCY MEDICINE

## 2018-12-05 VITALS
RESPIRATION RATE: 16 BRPM | TEMPERATURE: 97.7 F | WEIGHT: 104 LBS | DIASTOLIC BLOOD PRESSURE: 72 MMHG | HEIGHT: 64 IN | SYSTOLIC BLOOD PRESSURE: 115 MMHG | BODY MASS INDEX: 17.75 KG/M2 | OXYGEN SATURATION: 98 %

## 2018-12-05 DIAGNOSIS — T18.5XXA RECTAL FOREIGN BODY, INITIAL ENCOUNTER: ICD-10-CM

## 2018-12-05 PROCEDURE — 99283 EMERGENCY DEPT VISIT LOW MDM: CPT

## 2018-12-05 NOTE — ED AVS SNAPSHOT
Emergency Department    [address] [facility] [address]    Phone:  [phone]    Fax:  [phone]                                       Kassandra Aguilar   MRN: [id number]    Department:   Emergency Department   Date of Visit:  12/5/2018           Patient Information     Date Of Birth          1985        Your diagnoses for this visit were:     Rectal foreign body, initial encounter        You were seen by Almita Cunha MD.      Follow-up Information     Follow up with  Emergency Department.    Specialty:  EMERGENCY MEDICINE    Why:  As needed for rectal bleeding or pain or vomiting or fever    Contact information:    [address] [facility] [address] [phone]        Follow up with Robin Arreguin MD.    Specialty:  Internal Medicine    Why:  As needed    Contact information:    [address]  [facility] [address]  [phone]        Discharge References/Attachments     RECTAL FOREIGN BODY, REMOVED (ADULT) (ENGLISH)      24 Hour Appointment Hotline       To make an appointment at any [facility], call [phone] ([phone]). If you don't have a family doctor or clinic, we will help you find one. [facility] clinics are conveniently located to serve the needs of you and your family.             Review of your medicines      Notice     You have not been prescribed any medications.            Orders Needing Specimen Collection     None      Pending Results     No orders found for last 3 day(s).            Pending Culture Results     No orders found for last 3 day(s).            Pending Results Instructions     If you had any lab results that were not finalized at the time of your Discharge, you can call the ED Lab Result RN at [phone]. You will be contacted by this team for any positive Lab results or changes in treatment. The nurses are available 7 days a week from 10A to 6:30P.  You can leave a message 24  hours per day and they will return your call.        Test Results From Your Hospital Stay               Clinical Quality Measure: Blood Pressure Screening     Your blood pressure was checked while you were in the emergency department today. The last reading we obtained was  BP: 115/72 . Please read the guidelines below about what these numbers mean and what you should do about them.  If your systolic blood pressure (the top number) is less than 120 and your diastolic blood pressure (the bottom number) is less than 80, then your blood pressure is normal. There is nothing more that you need to do about it.  If your systolic blood pressure (the top number) is 120-139 or your diastolic blood pressure (the bottom number) is 80-89, your blood pressure may be higher than it should be. You should have your blood pressure rechecked within a year by a primary care provider.  If your systolic blood pressure (the top number) is 140 or greater or your diastolic blood pressure (the bottom number) is 90 or greater, you may have high blood pressure. High blood pressure is treatable, but if left untreated over time it can put you at risk for heart attack, stroke, or kidney failure. You should have your blood pressure rechecked by a primary care provider within the next 4 weeks.  If your provider in the emergency department today gave you specific instructions to follow-up with your doctor or provider even sooner than that, you should follow that instruction and not wait for up to 4 weeks for your follow-up visit.        Thank you for choosing [facility]       Thank you for choosing [facility] for your care. Our goal is always to provide you with excellent care. Hearing back from our patients is one way we can continue to improve our services. Please take a few minutes to complete the written survey that you may receive in the mail after you visit with us. Thank you!        TabbedOuthart Information     [employer] gives you secure access to your  electronic health record. If you see a primary care provider, you can also send messages to your care team and make appointments. If you have questions, please call your primary care clinic.  If you do not have a primary care provider, please call [phone] and they will assist you.        Care EveryWhere ID     This is your Care EveryWhere ID. This could be used by other organizations to access your [facility] medical records  [id number]        Equal Access to Services     JIMMY GALARZA : Christine Dent, jos montes de oca, maurice bass, bart shelley . So [facility] [phone].    ATENCIÓN: Si habla español, tiene a sharp disposición servicios gratuitos de asistencia lingüística. Faribaame al [phone].    We comply with applicable federal civil rights laws and [place] laws. We do not discriminate on the basis of race, color, national origin, age, disability, sex, sexual orientation, or gender identity.            After Visit Summary       This is your record. Keep this with you and show to your community pharmacist(s) and doctor(s) at your next visit.

## 2018-12-05 NOTE — ED AVS SNAPSHOT
Emergency Department    [address] [place] [address]    Phone:  [phone]    Fax:  [phone]                                       Kassandra Aguilar   MRN: [id number]    Department:   Emergency Department   Date of Visit:  12/5/2018           After Visit Summary Signature Page     I have received my discharge instructions, and my questions have been answered. I have discussed any challenges I see with this plan with the nurse or doctor.    ..........................................................................................................................................  Patient/Patient Representative Signature      ..........................................................................................................................................  Patient Representative Print Name and Relationship to Patient    ..................................................               ................................................  Date                                   Time    ..........................................................................................................................................  Reviewed by Signature/Title    ...................................................              ..............................................  Date                                               Time          22EPIC Rev 08/18

## 2018-12-06 ASSESSMENT — ENCOUNTER SYMPTOMS
ANAL BLEEDING: 0
RECTAL PAIN: 0
VOMITING: 0

## 2018-12-06 NOTE — ED PROVIDER NOTES
"  History     Chief Complaint:    Foreign Body in Rectum      HPI   Kassandra Aguilar is a 33 year old female who presents to the ED for removal of a foreign body in her rectum. About 30 minutes prior to arrival, the patient states that she was using a small vibrator in her rectum which subsequently got stuck. She was unable to remove the vibrator so she presented to the ED for assistance. She denies any rectal bleeding, rectal pain, or vomiting. The patient has no other complaints.     Allergies:  The patient has no known drug allergies.     Medications:    The patient is currently on no regular medications.    Past Medical History:    The patient denies any significant past medical history.    Past Surgical History:    The patient does not have any pertinent past surgical history.    Family History:    Thyroid disease  Migraine    Social History:  Negative for tobacco use.  Negative for alcohol use.  Marital Status:  Single [1]     Review of Systems   Gastrointestinal: Negative for anal bleeding, rectal pain and vomiting.   All other systems reviewed and are negative.    Physical Exam   First Vitals:  BP: 115/72  Heart Rate: 78  Temp: 97.7  F (36.5  C)  Resp: 16  Height: 162.6 cm (5' 4\")  Weight: 47.2 kg (104 lb)  SpO2: 98 %      Physical Exam  Nursing note and vitals reviewed.  Constitutional:  Appears well-developed and well-nourished.   HENT:   Head:    Atraumatic.   Mouth/Throat:   Oropharynx is clear and moist. No oropharyngeal exudate.   Eyes:    Pupils are equal, round, and reactive to light.   Neck:    Normal range of motion. Neck supple.      No tracheal deviation present. No thyromegaly present.   Cardiovascular:  Normal rate, regular rhythm, no murmur   Pulmonary/Chest: Breath sounds are clear and equal without wheezes or crackles.  Abdominal:   Soft. Bowel sounds are normal. Exhibits no distension and      no mass. There is no tenderness.      There is no rebound and no guarding. "   Rectum:   Vibrating vibrator in the rectum.  Musculoskeletal:  Exhibits no edema.   Lymphadenopathy:  No cervical adenopathy.   Neurological:   Alert and oriented to person, place, and time.   Skin:    Skin is warm and dry. No rash noted. No pallor.       Emergency Department Course   Procedures:  Using sterile lubricant jelly and a ringed forceps, I was able to inset the forceps into her rectum past the anus and open the forceps. Using my finger, I guided the rings of the forceps around the foreign body while feeling to make sure that I did not grasp the rectal wall tissue. I was able to remove the foreign body with the ringed forceps without any bleeding or signs of complication.       Emergency Department Course:  Nursing notes and vitals reviewed. (0000) I performed an exam of the patient as documented above.     I performed a foreign body removal as noted above.      Findings and plan explained to the Patient. Patient discharged home with instructions regarding supportive care, medications, and reasons to return. The importance of close follow-up was reviewed.       Impression & Plan    Medical Decision Making:  The patient arrives with a rectal foreign body which is a vibrator which is turned on. I was able to remove it with ring forceps. There was no sign of perforation or complication. I felt she could be safely discharged to home. She was instructed to return for bleeding, pain, fever, vomiting, or any other concerns.       Diagnosis:    ICD-10-CM    1. Rectal foreign body, initial encounter T18.5XXA        Disposition:  discharged to home    Scribe Disclosure:  Benjamin MATTHEWS, am serving as a scribe on 12/6/2018 at 12:00 AM to personally document services performed by Almita Cunha MD based on my observations and the provider's statements to me.          Benjamin Azevedo  12/5/2018    EMERGENCY DEPARTMENT       Almita Cunha MD  12/06/18 0202

## 2019-11-06 ENCOUNTER — HEALTH MAINTENANCE LETTER (OUTPATIENT)
Age: 34
End: 2019-11-06

## 2020-11-29 ENCOUNTER — HEALTH MAINTENANCE LETTER (OUTPATIENT)
Age: 35
End: 2020-11-29

## 2021-05-25 NOTE — TELEPHONE ENCOUNTER
Called pt to clarify OCP request, no active OCP in pt's medication chart. Pt requesting orsythia, which is the AUBRA she had been prescribed in the past.    AUBRA       Last Written Prescription Date:  2/23/16  Last Fill Quantity: 84,   # refills: 3  Last Office Visit with [facility] primary care provider:  2/23/16-annual, 10/18/16-vaginal problem visit  Future Office visit: none    Routing refill request to provider for review/approval because:  Pt does not surrently have insurance and is unable to come in to be seen, pt is requesting a refill. Note routed to Perlita Raymond to fill for pt?   
Needs refill on birth control. Does not have insurance [facility] and cannot afford to come in to be seen.  Wants to know if she can get a refill.  
Patient needs to know asap. [facility]  
none

## 2021-09-19 ENCOUNTER — HEALTH MAINTENANCE LETTER (OUTPATIENT)
Age: 36
End: 2021-09-19

## 2022-01-09 ENCOUNTER — HEALTH MAINTENANCE LETTER (OUTPATIENT)
Age: 37
End: 2022-01-09

## 2022-11-21 ENCOUNTER — HEALTH MAINTENANCE LETTER (OUTPATIENT)
Age: 37
End: 2022-11-21

## 2023-04-16 ENCOUNTER — HEALTH MAINTENANCE LETTER (OUTPATIENT)
Age: 38
End: 2023-04-16

## 2025-07-31 DIAGNOSIS — E55.9 VITAMIN D DEFICIENCY: ICD-10-CM

## 2025-07-31 DIAGNOSIS — F41.1 GENERALIZED ANXIETY DISORDER: ICD-10-CM

## 2025-07-31 DIAGNOSIS — R53.83 FATIGUE: ICD-10-CM

## 2025-07-31 DIAGNOSIS — D50.9 IRON DEFICIENCY ANEMIA, UNSPECIFIED: ICD-10-CM

## 2025-07-31 DIAGNOSIS — D51.9 VITAMIN B12 DEFICIENCY ANEMIA: ICD-10-CM

## 2025-07-31 DIAGNOSIS — I43 NUTRITIONAL AND METABOLIC CARDIOMYOPATHY (H): ICD-10-CM

## 2025-07-31 DIAGNOSIS — E88.9 NUTRITIONAL AND METABOLIC CARDIOMYOPATHY (H): ICD-10-CM

## 2025-07-31 DIAGNOSIS — E63.9 NUTRITIONAL AND METABOLIC CARDIOMYOPATHY (H): ICD-10-CM

## 2025-07-31 DIAGNOSIS — E56.9 MULTIPLE VITAMIN DEFICIENCY DISEASE: ICD-10-CM

## 2025-07-31 DIAGNOSIS — G47.00 PERSISTENT DISORDER OF INITIATING OR MAINTAINING SLEEP: Primary | ICD-10-CM
